# Patient Record
Sex: FEMALE | Race: WHITE | Employment: OTHER | URBAN - METROPOLITAN AREA
[De-identification: names, ages, dates, MRNs, and addresses within clinical notes are randomized per-mention and may not be internally consistent; named-entity substitution may affect disease eponyms.]

---

## 2017-09-26 ENCOUNTER — HOSPITAL ENCOUNTER (OUTPATIENT)
Dept: LAB | Age: 82
Discharge: HOME | End: 2017-09-26
Payer: MEDICARE

## 2017-09-26 ENCOUNTER — OFFICE VISIT (OUTPATIENT)
Dept: GYNECOLOGY | Age: 82
End: 2017-09-26

## 2017-09-26 VITALS
DIASTOLIC BLOOD PRESSURE: 58 MMHG | SYSTOLIC BLOOD PRESSURE: 129 MMHG | HEART RATE: 59 BPM | HEIGHT: 63 IN | BODY MASS INDEX: 27.46 KG/M2 | WEIGHT: 155 LBS

## 2017-09-26 DIAGNOSIS — L90.0 LICHEN SCLEROSUS ET ATROPHICUS: Primary | ICD-10-CM

## 2017-09-26 DIAGNOSIS — Z85.3 HISTORY OF BREAST CANCER: ICD-10-CM

## 2017-09-26 DIAGNOSIS — Z91.89 GYN EXAM FOR HIGH-RISK MEDICARE PATIENT: ICD-10-CM

## 2017-09-26 PROCEDURE — 88142 CYTOPATH C/V THIN LAYER: CPT | Performed by: OBSTETRICS & GYNECOLOGY

## 2017-09-26 RX ORDER — BRIMONIDINE TARTRATE, TIMOLOL MALEATE 2; 5 MG/ML; MG/ML
SOLUTION/ DROPS OPHTHALMIC
COMMUNITY
Start: 2014-05-12

## 2017-09-26 RX ORDER — OMEPRAZOLE 40 MG/1
CAPSULE, DELAYED RELEASE ORAL
Refills: 3 | COMMUNITY
Start: 2017-09-04

## 2017-09-26 NOTE — PATIENT INSTRUCTIONS
KnowFu Activation    Thank you for requesting access to KnowFu. Please follow the instructions below to securely access and download your online medical record. KnowFu allows you to send messages to your doctor, view your test results, renew your prescriptions, schedule appointments, and more. How Do I Sign Up? 1. In your internet browser, go to [email or web address]. 2. Click on the First Time User? Click Here link in the Sign In box. You will see the New Member Sign Up page. 3. Enter your KnowFu Access Code exactly as it appears below. You will not need to use this code after youve completed the sign-up process. If you do not sign up before the expiration date, you must request a new code. KnowFu Access Code: [email or web address]  Expires: 2017  9:33 AM (This is the date your KnowFu access code will )    4. Enter the last four digits of your Social Security Number (xxxx) and Date of Birth (mm/dd/yyyy) as indicated and click Submit. You will be taken to the next sign-up page. 5. Create a KnowFu ID. This will be your KnowFu login ID and cannot be changed, so think of one that is secure and easy to remember. 6. Create a KnowFu password. You can change your password at any time. 7. Enter your Password Reset Question and Answer. This can be used at a later time if you forget your password. 8. Enter your e-mail address. You will receive e-mail notification when new information is available in [address] 9. Click Sign Up. You can now view and download portions of your medical record. 10. Click the Download Summary menu link to download a portable copy of your medical information. Additional Information    If you have questions, please visit the Frequently Asked Questions section of the KnowFu website at [email or web address] Remember, KnowFu is NOT to be used for urgent needs. For medical emergencies, dial 911.

## 2017-09-26 NOTE — PROGRESS NOTES
One year check up, pt reports no abnormal spotting or bleeding, pt states she has no questions or concerns for today's visit, Patient states she is no longer taking the following medications: Aciphex, Timolol

## 2017-09-26 NOTE — PROGRESS NOTES
[address], [address], [address] [place]  [phone]  MD Mehnaz Ford MD    Patient ID:  Natalee Alaniz  [id number]  1934/83 y.o. Visit date: 9/26/2017    INTERVAL HISTORY: Natalee Alaniz is a  female with a history of vulvar lichen sclerosis. Pathology has shown   FINAL PATHOLOGIC DIAGNOSIS (2010)  1. Vulva, 3:00, biopsy:  Benign skin with hyperkeratosis and papillary dermal pigment deposition  See comment  2. Vulva, 9:61, biopsy:  Lichen sclerosis et atrophicus  See comment  3. Vulva, 9:00, biopsy:  Hyperkeratosis with increased junctional and papillary dermal pigmentation  See comment . Last cytology:  9/17/2016, 2/2014   Negative    Imaging history: mammogram current. Chemotherapy history: N/A    She is being seen today for routine followup at a twelve month interval. Symptomatic  use of Clobetasol. Active, no restrictions. Patient denies any abnormal bleeding or vaginal discharge. Weight stable.     OB/GYN ROS: Denies, dysuria, hematuria, vaginal discharge, abnormal vaginal bleeding, pelvic pain    Past Medical History:   Diagnosis Date    Aortic insufficiency     Arthritis     Cancer (HCC)     BREAST CA    Fibromyalgia     Gastro-oesophageal reflux disease with hiatal hernia     GERD (gastroesophageal reflux disease)     Glaucoma     High cholesterol     History of diagnostic mammography 3/21/16    Right breast, no evidence of malignancy    Hypertension     MGUS (monoclonal gammopathy of unknown significance)     Nausea & vomiting     Other ill-defined conditions     FIBROMYALGIA       Past Surgical History:   Procedure Laterality Date    CARDIAC SURG PROCEDURE UNLIST      aortic insuffientcy    HX CATARACT REMOVAL      LEFT EYE    HX CHOLECYSTECTOMY  1990    hernia repair  8/2013    HX GI  2011    COLONSCOPY/EGD    HX GI      hemrriodectomy 2014    HX HEENT      LASER LEFT EYE    HX HERNIA REPAIR      HX HYSTERECTOMY  1984    HX MASTECTOMY  1982    RIGHT MASTECTOMY/LYMPH NODES       Social History     Social History    Marital status:      Spouse name: N/A    Number of children: N/A    Years of education: N/A     Occupational History    Not on file. Social History Main Topics    Smoking status: Never Smoker    Smokeless tobacco: Never Used    Alcohol use No    Drug use: Yes     Special: Prescription    Sexual activity: Not on file     Other Topics Concern    Not on file     Social History Narrative       Family History   Problem Relation Age of Onset    Other Mother      CEREBRAL HEMORRHAGE    Other Father      AAA       Current Outpatient Prescriptions on File Prior to Visit   Medication Sig Dispense Refill    valsartan (DIOVAN) 80 mg tablet Take  by mouth daily.  Hydrochlorothiazide 12.5 mg tablet Take 12.5 mg by mouth daily.  metoprolol-XL (TOPROL XL) 25 mg XL tablet Take  by mouth nightly.  atorvastatin (LIPITOR) 10 mg tablet Take  by mouth nightly.  latanoprost (XALATAN) 0.005 % ophthalmic solution Administer 1 Drop to right eye nightly.  aspirin 81 mg tablet Take 162 mg by mouth nightly.  CALCIUM CITRATE (CITRACAL PO) Take 1 Tab by mouth daily.  LACTOBAC CMB #3/FOS/PANTETHINE (PROBIOTIC & ACIDOPHILUS PO) Take 1 Tab by mouth every morning.  cholecalciferol, vitamin d3, (VITAMIN D) 1,000 unit tablet Take 1,000 Units by mouth daily.  clobetasol (TEMOVATE) 0.05 % ointment APPLY TO AFFECTED AREA DAILY 30 g 6    rabeprazole (ACIPHEX) 20 mg tablet Take 20 mg by mouth every morning.  timolol (TIMOPTIC-XR) 0.5 % ophthalmic gel-forming Administer 1 Drop to right eye two (2) times a day. No current facility-administered medications on file prior to visit.         Allergies   Allergen Reactions    Clarithromycin Rash    Contrast Agent [Iodine] Rash    Penicillin G Rash       Review of Systems - History obtained from the patient  Hematological and Lymphatic ROS: negative for - bleeding problems, blood clots, swollen lymph nodes or weight loss  Respiratory ROS: no cough, shortness of breath, or wheezing  Cardiovascular ROS: no chest pain or dyspnea on exertion  Gastrointestinal ROS: no abdominal pain, change in bowel habits, or black or bloody stools  Genito-Urinary ROS: no dysuria, trouble voiding, or hematuria  Musculoskeletal ROS: negative for - joint pain or joint swelling  Dermatological ROS: negative for - rash or skin lesion changes    Negative     OBJECTIVE:  PHYSICAL EXAM  VITAL SIGNS: Vitals:    09/26/17 0936   BP: 129/58   Pulse: (!) 59   Weight: 155 lb (70.3 kg)   Height: 5' 3\" (1.6 m)   Body mass index is 27.46 kg/(m^2). GENERAL ARTIE: in no apparent distress, well developed    HEENT: within normal limits   RESPIRATORY: lungs clear to auscultation and percussion, no CVAT   CARDIOVASC: Regular rate and rhythm without [facility]   GASTROINT: soft, non-tender, without masses or organomegaly. No hernia low midline/upper abd scar. MUSCULOSKEL: no joint tenderness, deformity or swelling. EXTREMITIES: extremities normal, atraumatic, no edema   PELVIC: External genitalia: normal general appearance Vaginal: atrophic mucosa  No active inflamation/erythema  BUS negative  Vagina: Atrophic, no gross lesion. No suspicious induration or nodularity. Narrowed introitus  Cytology taken  Bimanual: No suspicious masses, induration or nodularity. PSW clear   RECTAL: rectal exam not indicated   LEESA SURVEY: Cervical, supraclavicular, and ingunal nodes normal. No inguinal hernia   NEURO: Grossly normal     DATE REVIEW as available:      IMPRESSION AND PLAN:    Karen Renee has a working diagnosis of L. Sclerosis and ongoing vulvar lesions. Continue clobetasol as needed. Hx Breast cancer, ER negative per report: mammogram due, followed in [place], she is taking care of this.     Cytology taken without friability    Return twelve months or PRN.       Vee Tyler MD  9/26/2017/10:13 AM

## 2017-10-04 NOTE — PROGRESS NOTES
Patient: Catie Cleveland  SSN: [id number]  : 1934    Date:    10/4/2017    Ms. Cristina Plasencia's cytology/Pap smear has been interpreted as within normal limts. I would ask that subsequent Pap smears be performed at the interval discussed at the last office visit.     If there are any questions please do not hesitate to contact our offices ([phone])    Daniel Dominguez MD

## 2018-10-02 ENCOUNTER — OFFICE VISIT (OUTPATIENT)
Dept: GYNECOLOGY | Age: 83
End: 2018-10-02

## 2018-10-02 VITALS
DIASTOLIC BLOOD PRESSURE: 52 MMHG | BODY MASS INDEX: 27.78 KG/M2 | SYSTOLIC BLOOD PRESSURE: 126 MMHG | HEART RATE: 56 BPM | HEIGHT: 63 IN | WEIGHT: 156.8 LBS

## 2018-10-02 DIAGNOSIS — N90.4 LICHEN SCLEROSUS OF FEMALE GENITALIA: Primary | ICD-10-CM

## 2018-10-02 RX ORDER — IRBESARTAN 150 MG/1
TABLET ORAL
COMMUNITY
Start: 2018-09-18

## 2018-10-02 NOTE — PROGRESS NOTES
[facility], [address] 
[phone] MD Poli Miller MD 
 
Patient ID: Santiago Schilder [id number] 
1934/84 y.o. Visit date: 10/2/2018 INTERVAL HISTORY: Santiago Schilder is a  female with a history of vulvar lichen sclerosis. Pathology from 2010 as per below. She is being seen today for routine followup at a twelve month interval. Denies vulvar itching/irritation or any new bumps/lumps. Denies vaginal bleeding/discharge. Denies abdominal/pelvic pain, change in appetite or bowel movements, hematuria, hematochezia, or urinary symptoms. She does report urinary incontinence, but this has been a problem historically and is stable. She uses clobetasol about 2-3 times per month on her vulvar. PATHOLOGY REVIEW: 
FINAL PATHOLOGIC DIAGNOSIS (2010) 1. Vulva, 3:00, biopsy: 
Benign skin with hyperkeratosis and papillary dermal pigment deposition See comment 2. Vulva, 9:78, biopsy: 
Lichen sclerosis et atrophicus See comment 3. Vulva, 9:00, biopsy: Hyperkeratosis with increased junctional and papillary dermal pigmentation See commen OB/GYN ROS: Denies, dysuria, hematuria, vaginal discharge, abnormal vaginal bleeding, pelvic pain Past Medical History:  
Diagnosis Date  Aortic insufficiency  Arthritis  Cancer ([facility] [address] BREAST CA  
 Fibromyalgia  Gastro-oesophageal reflux disease with hiatal hernia  GERD (gastroesophageal reflux disease)  Glaucoma  High cholesterol  History of diagnostic mammography 3/21/16 Right breast, no evidence of malignancy  Hypertension  MGUS (monoclonal gammopathy of unknown significance)  Nausea & vomiting  Other ill-defined conditions(799.89) FIBROMYALGIA Past Surgical History:  
Procedure Laterality Date  CARDIAC SURG PROCEDURE UNLIST    
 aortic insuffientcy  HX CATARACT REMOVAL    
 LEFT EYE  
  HX CHOLECYSTECTOMY  1990  
 hernia repair  8/2013  HX GI  2011 COLONSCOPY/EGD  HX GI    
 hemrriodectomy 2014  HX HEENT    
 LASER LEFT EYE [address] [facility] RIGHT MASTECTOMY/LYMPH NODES Social History Social History  Marital status:  Spouse name: N/A  
 Number of children: N/A  
 Years of education: N/A Occupational History  Not on file. Social History Main Topics  Smoking status: Never Smoker  Smokeless tobacco: Never Used  Alcohol use No  
 Drug use: Yes Special: Prescription  Sexual activity: Not on file Other Topics Concern  Not on file Social History Narrative Family History Problem Relation Age of Onset [facility] Other Mother CEREBRAL HEMORRHAGE  Other Father AAA Current Outpatient Prescriptions on File Prior to Visit Medication Sig Dispense Refill  omeprazole (PRILOSEC) 40 mg capsule TAKE 1 CAPSULE BY MOUTH 30-60 MINUTES BEFORE BREAKFAST  3  
 brimonidine-timolol (COMBIGAN) 0.2-0.5 % drop ophthalmic solution  VIT A/VIT C/VIT E/ZINC/COPPER (PRESERVISION AREDS PO) Take  by mouth.  Hydrochlorothiazide 12.5 mg tablet Take 12.5 mg by mouth daily.  metoprolol-XL (TOPROL XL) 25 mg XL tablet Take  by mouth nightly.  timolol (TIMOPTIC-XR) 0.5 % ophthalmic gel-forming Administer 1 Drop to right eye two (2) times a day.  latanoprost (XALATAN) 0.005 % ophthalmic solution Administer 1 Drop to right eye nightly.  aspirin 81 mg tablet Take 162 mg by mouth nightly.  CALCIUM CITRATE (CITRACAL PO) Take 1 Tab by mouth daily.  LACTOBAC CMB #3/FOS/PANTETHINE (PROBIOTIC & ACIDOPHILUS PO) Take 1 Tab by mouth every morning.  cholecalciferol, vitamin d3, (VITAMIN D) 1,000 unit tablet Take 1,000 Units by mouth daily.     
 clobetasol (TEMOVATE) 0.05 % ointment APPLY TO AFFECTED AREA DAILY 30 g 6  
  valsartan (DIOVAN) 80 mg tablet Take  by mouth daily.  rabeprazole (ACIPHEX) 20 mg tablet Take 20 mg by mouth every morning.  atorvastatin (LIPITOR) 10 mg tablet Take  by mouth nightly. No current facility-administered medications on file prior to visit. Allergies Allergen Reactions  Clarithromycin Rash  Contrast Agent [Iodine] Rash  Penicillin G Rash Review of Systems - History obtained from the patient Hematological and Lymphatic ROS: negative for - bleeding problems, blood clots, swollen lymph nodes or weight loss Respiratory ROS: no cough, shortness of breath, or wheezing Cardiovascular ROS: no chest pain or dyspnea on exertion Gastrointestinal ROS: no abdominal pain, change in bowel habits, or black or bloody stools Genito-Urinary ROS: no dysuria, trouble voiding, or hematuria Musculoskeletal ROS: negative for - joint pain or joint swelling Dermatological ROS: negative for - rash or skin lesion changes Negative OBJECTIVE: 
 
Visit Vitals  /52 (BP 1 Location: Left arm, BP Patient Position: Sitting)  Pulse (!) 56  
 Ht 5' 3\" (1.6 m)  Wt 156 lb 12.8 oz (71.1 kg)  BMI 27.78 kg/m2 Physical Exam: 
General: Alert and oriented. No acute distress. Well-nourished HEENT: No thyroid enlargment. Neck supple without restrictions. Sclera normal. Normal occular motion. Moist mucous membranes. Lymphatics: No evidence of cervical, or subclavicular adenopathy. Respiratory: clear to auscultation and percussion to the bases. No CVAT. Cardiovascular: regular rate and rhythm. No murmurs, rubs, or gallops. Gastrointestinal: soft, non-tender, non-distended, no masses or organomegaly. Well-healed incision. Musculoskeletal: normal gait. No joint tenderness, deformity or swelling. No muscular tenderness. Extremities: extremities normal, atraumatic, no cyanosis or edema. Pelvic: exam chaperoned by nurse.  External genitalia appear normal with some atrophy. No lesions noted. On speculum exam, the vagina is atrophic. The uterus and cervix are surgically absent. On bimanual exam, there is no masses or nodularity. Rectal exam deferred. Neuro: Grossly intact. Normal gait and movement. No acute deficit Skin: No evidence of rashes or skin changes. IMPRESSION AND PLAN: 
 
Eliana Benedict is a 80 y.o. female with Lichen Sclerosis Reviewed patient's course to date. She uses clobetasol sparingly, approximately 2-3 times per month. Reassured patient that today's exam is normal without evidence of vulvar lesions. Plan to continue clobetasol as needed. In regard to her vaginal cytology, she has had a hysterectomy and has never had an abnormal pap smear. Based on ASCCP guidelines, no further pap smears are indicated. Patient also has a history of breast cancer, ER negative. She is followed locally for this and receives regular imaging. RTC in 12 months for continued surveillance. Reviewed precautionary symptoms to return to clinic sooner. All questions and concerns were addressed with the patient and she is comfortable with the plan.  
 
 
Seymour Jimenez MD 
10/2/2018/10:13 AM

## 2018-10-02 NOTE — PROGRESS NOTES
One year check up, pt states she has no questions or concerns for today's visit, Patient states she is no longer taking the following medications:  Lipitor, aciphex, diovan 1. Have you been to the ER, urgent care clinic since your last visit? Hospitalized since your last visit?  no 
 
2. Have you seen or consulted any other health care providers outside of the [address] [place] [address] since your last visit? Include any pap smears or colon screening.    no

## 2019-11-19 ENCOUNTER — OFFICE VISIT (OUTPATIENT)
Dept: GYNECOLOGY | Age: 84
End: 2019-11-19

## 2019-11-19 VITALS
BODY MASS INDEX: 25.41 KG/M2 | SYSTOLIC BLOOD PRESSURE: 170 MMHG | HEIGHT: 63 IN | WEIGHT: 143.4 LBS | HEART RATE: 62 BPM | DIASTOLIC BLOOD PRESSURE: 69 MMHG

## 2019-11-19 DIAGNOSIS — N90.4 LICHEN SCLEROSUS OF FEMALE GENITALIA: Primary | ICD-10-CM

## 2019-11-19 RX ORDER — CLOBETASOL PROPIONATE 0.5 MG/G
OINTMENT TOPICAL
Qty: 30 G | Refills: 1 | Status: SHIPPED | OUTPATIENT
Start: 2019-11-19

## 2019-11-19 RX ORDER — ADHESIVE BANDAGE
30 BANDAGE TOPICAL DAILY PRN
COMMUNITY

## 2019-11-19 NOTE — PROGRESS NOTES
[address] [place] [address] [address], [address]  [phone]  MD Jorge Pierce MD    Patient ID:  Tino Son  [id number]  1934/85 y.o. Visit date: 11/19/2019    INTERVAL HISTORY: Tino Son is a 80 y.o.  female with a history of vulvar lichen sclerosis. Pathology from 2010 as per below. She is being seen today for routine followup at a twelve month interval. Denies vulvar itching/irritation or any new bumps/lumps. Denies vaginal bleeding/discharge. Denies abdominal/pelvic pain, change in appetite or bowel movements, hematuria, hematochezia, or urinary symptoms. She does report urinary incontinence, but this has been a problem historically and is stable. She uses clobetasol about 2-3 times every 2-3 months. In the last year she has had a vertebral fracture and treatment for glaucoma. PATHOLOGY REVIEW:  FINAL PATHOLOGIC DIAGNOSIS (2010)  1. Vulva, 3:00, biopsy:  Benign skin with hyperkeratosis and papillary dermal pigment deposition  See comment  2. Vulva, 0:90, biopsy:  Lichen sclerosis et atrophicus  See comment  3.  Vulva, 9:00, biopsy:  Hyperkeratosis with increased junctional and papillary dermal pigmentation  See commen    OB/GYN ROS: Denies, dysuria, hematuria, vaginal discharge, abnormal vaginal bleeding, pelvic pain    Past Medical History:   Diagnosis Date    Aortic insufficiency     Arthritis     Cancer (HCC)     BREAST CA    Fibromyalgia     Gastro-oesophageal reflux disease with hiatal hernia     GERD (gastroesophageal reflux disease)     Glaucoma     High cholesterol     History of diagnostic mammography 3/21/16    Right breast, no evidence of malignancy    Hypertension     MGUS (monoclonal gammopathy of unknown significance)     Nausea & vomiting     Other ill-defined conditions(349.89)     FIBROMYALGIA       Past Surgical History:   Procedure Laterality Date    CARDIAC SURG PROCEDURE UNLIST      aortic insuffientcy    HX CATARACT REMOVAL      LEFT EYE    HX CHOLECYSTECTOMY  1990    hernia repair  8/2013    HX GI  2011    COLONSCOPY/EGD    HX GI      hemrriodectomy 2014    HX HEENT      LASER LEFT EYE    HX HERNIA REPAIR      HX HYSTERECTOMY  1984    HX MASTECTOMY  1982    RIGHT MASTECTOMY/LYMPH NODES       Social History     Socioeconomic History    Marital status:      Spouse name: Not on file    Number of children: Not on file    Years of education: Not on file    Highest education level: Not on file   Occupational History    Not on file   Social Needs    Financial resource strain: Not on file    Food insecurity:     Worry: Not on file     Inability: Not on file    Transportation needs:     Medical: Not on file     Non-medical: Not on file   Tobacco Use    Smoking status: Never Smoker    Smokeless tobacco: Never Used   Substance and Sexual Activity    Alcohol use: No    Drug use: Yes     Types: Prescription    Sexual activity: Not on file   Lifestyle    Physical activity:     Days per week: Not on file     Minutes per session: Not on file    Stress: Not on file   Relationships    Social connections:     Talks on phone: Not on file     Gets together: Not on file     Attends Gnosticism service: Not on file     Active member of club or organization: Not on file     Attends meetings of clubs or organizations: Not on file     Relationship status: Not on file    Intimate partner violence:     Fear of current or ex partner: Not on file     Emotionally abused: Not on file     Physically abused: Not on file     Forced sexual activity: Not on file   Other Topics Concern    Not on file   Social History Narrative    Not on file       Family History   Problem Relation Age of Onset    Other Mother         CEREBRAL HEMORRHAGE    Other Father         AAA       Current Outpatient Medications on File Prior to Visit   Medication Sig Dispense Refill    magnesium hydroxide (GALAN MILK OF MAGNESIA) 400 mg/5 mL suspension Take 30 mL by mouth daily as needed for Constipation.  COQ10, LIPOSOMAL UBIQUINOL, PO Take  by mouth.  brimonidine-timolol (COMBIGAN) 0.2-0.5 % drop ophthalmic solution       VIT A/VIT C/VIT E/ZINC/COPPER (PRESERVISION AREDS PO) Take  by mouth.  Hydrochlorothiazide 12.5 mg tablet Take 12.5 mg by mouth daily.  metoprolol-XL (TOPROL XL) 25 mg XL tablet Take  by mouth nightly.  latanoprost (XALATAN) 0.005 % ophthalmic solution Administer 1 Drop to right eye nightly.  CALCIUM CITRATE (CITRACAL PO) Take 1 Tab by mouth daily.  LACTOBAC CMB #3/FOS/PANTETHINE (PROBIOTIC & ACIDOPHILUS PO) Take 1 Tab by mouth every morning.  cholecalciferol, vitamin d3, (VITAMIN D) 1,000 unit tablet Take 1,000 Units by mouth daily.  irbesartan (AVAPRO) 150 mg tablet       docosahexanoic acid/epa (FISH OIL PO) Take  by mouth.  omeprazole (PRILOSEC) 40 mg capsule TAKE 1 CAPSULE BY MOUTH 30-60 MINUTES BEFORE BREAKFAST  3    valsartan (DIOVAN) 80 mg tablet Take  by mouth daily.  RABEprazole (ACIPHEX) 20 mg tablet Take 20 mg by mouth every morning.  atorvastatin (LIPITOR) 10 mg tablet Take  by mouth nightly.  timolol (TIMOPTIC-XR) 0.5 % ophthalmic gel-forming Administer 1 Drop to right eye two (2) times a day.  aspirin 81 mg tablet Take 162 mg by mouth nightly. No current facility-administered medications on file prior to visit.         Allergies   Allergen Reactions    Clarithromycin Rash    Contrast Agent [Iodine] Rash    Lipitor [Atorvastatin] Other (comments)     Per patient, bone pain    Penicillin G Rash    Sulfamethoxazole-Trimethoprim Rash       Review of Systems - History obtained from the patient  Hematological and Lymphatic ROS: negative for - bleeding problems, blood clots, swollen lymph nodes or weight loss  Respiratory ROS: no cough, shortness of breath, or wheezing  Cardiovascular ROS: no chest pain or dyspnea on exertion  Gastrointestinal ROS: no abdominal pain, change in bowel habits, or black or bloody stools  Genito-Urinary ROS: no dysuria, trouble voiding, or hematuria  Musculoskeletal ROS: negative for - joint pain or joint swelling  Dermatological ROS: negative for - rash or skin lesion changes    Negative     OBJECTIVE:    Visit Vitals  /69 (BP 1 Location: Left arm, BP Patient Position: Sitting)   Pulse 62   Ht 5' 3\" (1.6 m)   Wt 143 lb 6.4 oz (65 kg)   BMI 25.40 kg/m²      Physical Exam:  General: Alert and oriented. No acute distress. Well-nourished  HEENT: No thyroid enlargment. Neck supple without restrictions. Sclera normal. Normal occular motion. Moist mucous membranes. Lymphatics: No evidence of cervical, or subclavicular adenopathy. Respiratory: clear to auscultation and percussion to the bases. No CVAT. Cardiovascular: regular rate and rhythm. No murmurs, rubs, or gallops. Gastrointestinal: soft, non-tender, non-distended, no masses or organomegaly. Well-healed incision. Musculoskeletal: normal gait. No joint tenderness, deformity or swelling. No muscular tenderness. Extremities: extremities normal, atraumatic, no cyanosis or edema. Pelvic: exam chaperoned by nurse. External genitalia appear normal with some atrophy. No lesions noted. On speculum exam, the vagina is atrophic. The uterus and cervix are surgically absent. On bimanual exam, there is no masses or nodularity. Rectal exam deferred. Neuro: Grossly intact. Normal gait and movement. No acute deficit  Skin: No evidence of rashes or skin changes. IMPRESSION AND PLAN:    Mecca Tovar is a 80 y.o. female with Lichen Sclerosis     Problem List Items Addressed This Visit        Integumentary    Lichen sclerosus of female genitalia - Primary    Relevant Medications    clobetasol (TEMOVATE) 0.05 % ointment          Reviewed patient's course to date.  She uses clobetasol sparingly, approximately 2-3 times every 2-3 month. Refill given today. Reassured patient that today's exam is normal without evidence of vulvar lesions. Plan to continue clobetasol as needed. In regard to her vaginal cytology, she has had a hysterectomy and has never had an abnormal pap smear. Based on ASCCP guidelines, no further pap smears are indicated. Patient also has a history of breast cancer, ER negative. She is followed locally for this and receives regular imaging. RTC in 12 months for continued surveillance. Reviewed precautionary symptoms to return to clinic sooner. All questions and concerns were addressed with the patient and she is comfortable with the plan.     Sherry Barnes MD

## 2019-11-19 NOTE — PROGRESS NOTES
One year check up, pt reports no abnormal spotting or bleeding, pt states she has been having shoulder and arm pain, she has seen neurologist and orthopedic md, pt states she needs a refill on the clobetasol    Initial blood pressure reading 168/69, repeat blood pressure reading 170/69      1. Have you been to the ER, urgent care clinic since your last visit? Hospitalized since your last visit?  no    2. Have you seen or consulted any other health care providers outside of the [address] [place] [address] since your last visit? Include any pap smears or colon screening.    no

## 2020-02-10 PROBLEM — H34.8310 BRANCH RETINAL VEIN OCCLUSION WITH MACULAR EDEMA: Noted: 2021-12-01

## 2020-02-10 PROBLEM — H25.11 NS CATARACT: Noted: 2019-12-20

## 2020-02-10 PROBLEM — Z98.41 CATARACT EXTRACTION STATUS: Noted: 2020-02-10

## 2020-02-10 PROBLEM — H40.1134 POAG, INDETERMINATE: Noted: 2021-12-01

## 2020-02-10 PROBLEM — Z98.42 CATARACT EXTRACTION STATUS: Noted: 2020-02-10

## 2020-02-10 PROBLEM — H35.372 EPIRETINAL MEMBRANE: Noted: 2021-12-01

## 2020-12-07 NOTE — PROGRESS NOTES
One year follow up for lichen sclerosus, pt reports no abnormal spotting or bleeding, pt states she has no questions or concerns for today's visit    1. Have you been to the ER, urgent care clinic since your last visit? Hospitalized since your last visit?  no    2. Have you seen or consulted any other health care providers outside of the [address] [place] [address] since your last visit? Include any pap smears or colon screening.    No    Vitals:    12/08/20 0928 12/08/20 0937   BP: (!) 153/60 (!) 148/64   BP 1 Location: Left arm Left arm   BP Patient Position: Sitting Sitting   Pulse: 69 62   Weight: 158 lb 3.2 oz (71.8 kg)    Height: 5' 3\" (1.6 m)

## 2020-12-07 NOTE — PROGRESS NOTES
[address] [place] [address] [address], [address] [facility]  [phone]  MD Davey Lizama MD    Patient ID:  Cara Rubinstein  [id number]  1934/86 y.o. Visit date: 12/8/2020    INTERVAL HISTORY: Cara Rubinstein is a 80 y.o.  female with a history of vulvar lichen sclerosis. Pathology from 2010 as per below. She is being seen today for routine followup at a twelve month interval. Denies vulvar itching/irritation or any new bumps/lumps. Denies vaginal bleeding/discharge. Denies abdominal/pelvic pain, change in appetite or bowel movements, hematuria, hematochezia, or urinary symptoms. She does report urinary incontinence, but this has been a problem historically and is stable. She uses clobetasol about 2-3 times every 2-3 months. In the last year she has had a vertebral fracture and treatment for glaucoma. PATHOLOGY REVIEW:  FINAL PATHOLOGIC DIAGNOSIS (2010)  1. Vulva, 3:00, biopsy:  Benign skin with hyperkeratosis and papillary dermal pigment deposition  See comment  2. Vulva, 4:82, biopsy:  Lichen sclerosis et atrophicus  See comment  3.  Vulva, 9:00, biopsy:  Hyperkeratosis with increased junctional and papillary dermal pigmentation  See commen    OB/GYN ROS: Denies, dysuria, hematuria, vaginal discharge, abnormal vaginal bleeding, pelvic pain    Past Medical History:   Diagnosis Date    Aortic insufficiency     Arthritis     Cancer (HCC)     BREAST CA    Fibromyalgia     Gastro-oesophageal reflux disease with hiatal hernia     GERD (gastroesophageal reflux disease)     Glaucoma     High cholesterol     History of diagnostic mammography 3/21/16    Right breast, no evidence of malignancy    Hypertension     MGUS (monoclonal gammopathy of unknown significance)     Nausea & vomiting     Other ill-defined conditions(799.89)     FIBROMYALGIA       Past Surgical History:   Procedure Laterality Date    CARDIAC SURG PROCEDURE UNLIST      aortic insuffientcy    HX CATARACT REMOVAL      LEFT EYE    HX CHOLECYSTECTOMY  1990    hernia repair  8/2013    HX GI  2011    COLONSCOPY/EGD    HX GI      hemrriodectomy 2014    HX HEENT      LASER LEFT EYE    HX HERNIA REPAIR      HX HYSTERECTOMY  1984    HX MASTECTOMY  1982    RIGHT MASTECTOMY/LYMPH NODES       Social History     Socioeconomic History    Marital status:      Spouse name: Not on file    Number of children: Not on file    Years of education: Not on file    Highest education level: Not on file   Occupational History    Not on file   Social Needs    Financial resource strain: Not on file    Food insecurity     Worry: Not on file     Inability: Not on file    Transportation needs     Medical: Not on file     Non-medical: Not on file   Tobacco Use    Smoking status: Never Smoker    Smokeless tobacco: Never Used   Substance and Sexual Activity    Alcohol use: No    Drug use: Yes     Types: Prescription    Sexual activity: Not on file   Lifestyle    Physical activity     Days per week: Not on file     Minutes per session: Not on file    Stress: Not on file   Relationships    Social connections     Talks on phone: Not on file     Gets together: Not on file     Attends Rastafarian service: Not on file     Active member of club or organization: Not on file     Attends meetings of clubs or organizations: Not on file     Relationship status: Not on file    Intimate partner violence     Fear of current or ex partner: Not on file     Emotionally abused: Not on file     Physically abused: Not on file     Forced sexual activity: Not on file   Other Topics Concern    Not on file   Social History Narrative    Not on file       Family History   Problem Relation Age of Onset    Other Mother         CEREBRAL HEMORRHAGE    Other Father         AAA       Current Outpatient Medications on File Prior to Visit   Medication Sig Dispense Refill    clobetasol (TEMOVATE) 0.05 % ointment APPLY TO AFFECTED AREA DAILY 30 g 1    irbesartan (AVAPRO) 150 mg tablet       COQ10, LIPOSOMAL UBIQUINOL, PO Take  by mouth.  brimonidine-timolol (COMBIGAN) 0.2-0.5 % drop ophthalmic solution       VIT A/VIT C/VIT E/ZINC/COPPER (PRESERVISION AREDS PO) Take  by mouth.  Hydrochlorothiazide 12.5 mg tablet Take 12.5 mg by mouth daily.  metoprolol-XL (TOPROL XL) 25 mg XL tablet Take  by mouth nightly.  atorvastatin (LIPITOR) 10 mg tablet Take  by mouth nightly.  timolol (TIMOPTIC-XR) 0.5 % ophthalmic gel-forming Administer 1 Drop to right eye two (2) times a day.  latanoprost (XALATAN) 0.005 % ophthalmic solution Administer 1 Drop to right eye nightly.  CALCIUM CITRATE (CITRACAL PO) Take 1 Tab by mouth daily.  LACTOBAC CMB #3/FOS/PANTETHINE (PROBIOTIC & ACIDOPHILUS PO) Take 1 Tab by mouth every morning.  cholecalciferol, vitamin d3, (VITAMIN D) 1,000 unit tablet Take 1,000 Units by mouth daily.  magnesium hydroxide ([employer] MILK OF MAGNESIA) 400 mg/5 mL suspension Take 30 mL by mouth daily as needed for Constipation.  docosahexanoic acid/epa (FISH OIL PO) Take  by mouth.  omeprazole (PRILOSEC) 40 mg capsule TAKE 1 CAPSULE BY MOUTH 30-60 MINUTES BEFORE BREAKFAST  3    valsartan (DIOVAN) 80 mg tablet Take  by mouth daily.  RABEprazole (ACIPHEX) 20 mg tablet Take 20 mg by mouth every morning.  aspirin 81 mg tablet Take 162 mg by mouth nightly. No current facility-administered medications on file prior to visit.         Allergies   Allergen Reactions    Clarithromycin Rash    Contrast Agent [Iodine] Rash    Lipitor [Atorvastatin] Other (comments)     Per patient, bone pain    Penicillin G Rash    Sulfamethoxazole-Trimethoprim Rash       Review of Systems - History obtained from the patient  Hematological and Lymphatic ROS: negative for - bleeding problems, blood clots, swollen lymph nodes or weight loss  Respiratory ROS: no cough, shortness of breath, or wheezing  Cardiovascular ROS: no chest pain or dyspnea on exertion  Gastrointestinal ROS: no abdominal pain, change in bowel habits, or black or bloody stools  Genito-Urinary ROS: no dysuria, trouble voiding, or hematuria  Musculoskeletal ROS: negative for - joint pain or joint swelling  Dermatological ROS: negative for - rash or skin lesion changes    Negative     OBJECTIVE:    Visit Vitals  BP (!) 148/64 (BP 1 Location: Left arm, BP Patient Position: Sitting)   Pulse 62   Ht 5' 3\" (1.6 m)   Wt 158 lb 3.2 oz (71.8 kg)   BMI 28.02 kg/m²      Physical Exam:  General: Alert and oriented. No acute distress. Well-nourished  HEENT: No thyroid enlargment. Neck supple without restrictions. Sclera normal. Normal occular motion. Moist mucous membranes. Lymphatics: No evidence of cervical, or subclavicular adenopathy. Respiratory: clear to auscultation and percussion to the bases. No CVAT. Cardiovascular: regular rate and rhythm. No murmurs, rubs, or gallops. Gastrointestinal: soft, non-tender, non-distended, no masses or organomegaly. Well-healed incision. Musculoskeletal: normal gait. No joint tenderness, deformity or swelling. No muscular tenderness. Extremities: extremities normal, atraumatic, no cyanosis or edema. Pelvic: exam chaperoned by nurse. External genitalia appear normal with some atrophy. No lesions noted. On speculum exam, the vagina is atrophic. The uterus and cervix are surgically absent. On bimanual exam, there is no masses or nodularity. Rectal exam deferred. Neuro: Grossly intact. Normal gait and movement. No acute deficit  Skin: No evidence of rashes or skin changes. IMPRESSION AND PLAN:    Natividad Escamilla is a 80 y.o. female with Lichen Sclerosis     Problem List Items Addressed This Visit        Integumentary    Lichen sclerosus of female genitalia - Primary          Reviewed patient's course to date.  She uses clobetasol sparingly, approximately 2-3 times every 2-3 month. Reassured patient that today's exam is normal without evidence of vulvar lesions. Plan to continue clobetasol as needed. In regard to her vaginal cytology, she has had a hysterectomy and has never had an abnormal pap smear. Based on ASCCP guidelines, no further pap smears are indicated. Patient also has a history of breast cancer, ER negative. She is followed locally for this and receives regular imaging. RTC in 12 months for continued surveillance. Reviewed precautionary symptoms to return to clinic sooner. All questions and concerns were addressed with the patient and she is comfortable with the plan.     Carroll Beckman MD

## 2020-12-08 ENCOUNTER — OFFICE VISIT (OUTPATIENT)
Dept: GYNECOLOGY | Age: 85
End: 2020-12-08
Payer: MEDICARE

## 2020-12-08 VITALS
WEIGHT: 158.2 LBS | DIASTOLIC BLOOD PRESSURE: 64 MMHG | HEIGHT: 63 IN | BODY MASS INDEX: 28.03 KG/M2 | HEART RATE: 62 BPM | SYSTOLIC BLOOD PRESSURE: 148 MMHG

## 2020-12-08 DIAGNOSIS — N90.4 LICHEN SCLEROSUS OF FEMALE GENITALIA: Primary | ICD-10-CM

## 2020-12-08 PROCEDURE — G0463 HOSPITAL OUTPT CLINIC VISIT: HCPCS | Performed by: OBSTETRICS & GYNECOLOGY

## 2020-12-08 PROCEDURE — 99213 OFFICE O/P EST LOW 20 MIN: CPT | Performed by: OBSTETRICS & GYNECOLOGY

## 2021-12-01 ENCOUNTER — PREPPED CHART (OUTPATIENT)
Dept: URBAN - METROPOLITAN AREA CLINIC 98 | Facility: CLINIC | Age: 86
End: 2021-12-01

## 2021-12-01 PROBLEM — H40.1134 POAG, INDETERMINATE: Noted: 2021-12-01

## 2021-12-01 PROBLEM — H35.372 EPIRETINAL MEMBRANE: Status: STABILIZING | Noted: 2021-12-01

## 2021-12-01 PROBLEM — H34.8310 BRANCH RETINAL VEIN OCCLUSION WITH MACULAR EDEMA: Status: STABILIZING | Noted: 2021-12-01

## 2021-12-01 PROBLEM — H35.372 EPIRETINAL MEMBRANE: Noted: 2021-12-01

## 2021-12-01 PROBLEM — H34.8310 BRANCH RETINAL VEIN OCCLUSION WITH MACULAR EDEMA: Noted: 2021-12-01

## 2022-02-18 ASSESSMENT — TONOMETRY
OD_IOP_MMHG: 9
OS_IOP_MMHG: 4

## 2022-02-18 ASSESSMENT — VISUAL ACUITY
OS_SC: 20/70-3
OD_SC: 20/25

## 2022-03-16 ENCOUNTER — FOLLOW UP (OUTPATIENT)
Dept: URBAN - METROPOLITAN AREA CLINIC 98 | Facility: CLINIC | Age: 87
End: 2022-03-16

## 2022-03-16 DIAGNOSIS — H35.372: ICD-10-CM

## 2022-03-16 DIAGNOSIS — H34.8310: ICD-10-CM

## 2022-03-16 DIAGNOSIS — H40.1134: ICD-10-CM

## 2022-03-16 PROCEDURE — PFS EYLEA PFS

## 2022-03-16 PROCEDURE — 67028 INJECTION EYE DRUG: CPT

## 2022-03-16 PROCEDURE — 99214 OFFICE O/P EST MOD 30 MIN: CPT | Mod: 25

## 2022-03-16 PROCEDURE — 92202 OPSCPY EXTND ON/MAC DRAW: CPT | Mod: 59

## 2022-03-16 PROCEDURE — 92134 CPTRZ OPH DX IMG PST SGM RTA: CPT

## 2022-03-16 ASSESSMENT — TONOMETRY
OD_IOP_MMHG: 10
OS_IOP_MMHG: 8

## 2022-03-16 ASSESSMENT — VISUAL ACUITY
OS_SC: 20/80-2
OD_SC: 20/25

## 2022-03-18 PROBLEM — N90.4 LICHEN SCLEROSUS OF FEMALE GENITALIA: Status: ACTIVE | Noted: 2018-10-02

## 2022-06-08 ENCOUNTER — ESTABLISHED COMPREHENSIVE EXAM (OUTPATIENT)
Dept: URBAN - METROPOLITAN AREA CLINIC 98 | Facility: CLINIC | Age: 87
End: 2022-06-08

## 2022-06-08 DIAGNOSIS — H35.372: ICD-10-CM

## 2022-06-08 DIAGNOSIS — H34.8310: ICD-10-CM

## 2022-06-08 DIAGNOSIS — H40.1134: ICD-10-CM

## 2022-06-08 PROCEDURE — 92134 CPTRZ OPH DX IMG PST SGM RTA: CPT

## 2022-06-08 PROCEDURE — 92202 OPSCPY EXTND ON/MAC DRAW: CPT | Mod: 59

## 2022-06-08 PROCEDURE — 67028 INJECTION EYE DRUG: CPT

## 2022-06-08 PROCEDURE — PFS EYLEA PFS

## 2022-06-08 PROCEDURE — 92014 COMPRE OPH EXAM EST PT 1/>: CPT | Mod: 25

## 2022-06-08 ASSESSMENT — VISUAL ACUITY
OS_SC: 20/80-2
OD_SC: 20/20

## 2022-06-08 ASSESSMENT — TONOMETRY: OD_IOP_MMHG: 9

## 2022-07-18 NOTE — PROGRESS NOTES
One year follow up for lichen sclerosus, pt last seen on 12/8/2020, pt reports no abnormal spotting or bleeding, pt states she had nausea and diarrhea for approx. 3 months at the beginning of year, cause of weight loss    1. Have you been to the ER, urgent care clinic since your last visit? Hospitalized since your last visit?  no    2. Have you seen or consulted any other health care providers outside of the [address] [place] [address] since your last visit? Include any pap smears or colon screening.    no

## 2022-07-18 NOTE — PROGRESS NOTES
[address] [place] [address] [address], [address] [facility]  [phone]  MD Kailash Lopez MD    Patient ID:  Ashanti Velazco  [id number]  1934/87 y.o. Visit date: 7/19/2022    INTERVAL HISTORY: Ashanti Velazco is a 80 y.o.  female with a history of vulvar lichen sclerosis. Pathology from 2010 as per below. She is being seen today for routine followup at a twelve month interval. Denies vulvar itching/irritation or any new bumps/lumps. Denies vaginal bleeding/discharge. Denies abdominal/pelvic pain, change in appetite or bowel movements, hematuria, hematochezia, or urinary symptoms. She does report urinary incontinence, but this has been a problem historically and is stable. She uses clobetasol about 2-3 times every 2-3 months. In the last year she has had a vertebral fracture and treatment for glaucoma. PATHOLOGY REVIEW:  FINAL PATHOLOGIC DIAGNOSIS (2010)  1. Vulva, 3:00, biopsy:  Benign skin with hyperkeratosis and papillary dermal pigment deposition  See comment  2. Vulva, 7:22, biopsy:  Lichen sclerosis et atrophicus  See comment  3.  Vulva, 9:00, biopsy:  Hyperkeratosis with increased junctional and papillary dermal pigmentation  See commen    OB/GYN ROS: Denies, dysuria, hematuria, vaginal discharge, abnormal vaginal bleeding, pelvic pain    Past Medical History:   Diagnosis Date    Aortic insufficiency     Arthritis     Cancer (HCC)     BREAST CA    Fibromyalgia     Gastro-oesophageal reflux disease with hiatal hernia     GERD (gastroesophageal reflux disease)     Glaucoma     High cholesterol     History of diagnostic mammography 3/21/16    Right breast, no evidence of malignancy    Hypertension     MGUS (monoclonal gammopathy of unknown significance)     Nausea & vomiting     Other ill-defined conditions(229.57)     FIBROMYALGIA       Past Surgical History:   Procedure Laterality Date    HX CATARACT REMOVAL LEFT EYE    HX CHOLECYSTECTOMY  1990    hernia repair  8/2013    HX GI  2011    COLONSCOPY/EGD    HX GI      hemrriodectomy 2014    HX HEENT      LASER LEFT EYE    HX HERNIA REPAIR      HX HYSTERECTOMY  1984    HX MASTECTOMY  1982    RIGHT MASTECTOMY/LYMPH NODES    [facility] CARDIAC SURG PROCEDURE UNLIST      aortic insuffientcy       Social History     Socioeconomic History    Marital status:      Spouse name: Not on file    Number of children: Not on file    Years of education: Not on file    Highest education level: Not on file   Occupational History    Not on file   Tobacco Use    Smoking status: Never Smoker    Smokeless tobacco: Never Used   Substance and Sexual Activity    Alcohol use: No    Drug use: Yes     Types: Prescription    Sexual activity: Not on file   Other Topics Concern    Not on file   Social History Narrative    Not on file     Social Determinants of Health     Financial Resource Strain:     Difficulty of Paying Living Expenses: Not on file   Food Insecurity:     Worried About Running Out of Food in the Last Year: Not on file    Norbert of Food in the Last Year: Not on file   Transportation Needs:     Lack of Transportation (Medical): Not on file    Lack of Transportation (Non-Medical):  Not on file   Physical Activity:     Days of Exercise per Week: Not on file    Minutes of Exercise per Session: Not on file   Stress:     Feeling of Stress : Not on file   Social Connections:     Frequency of Communication with Friends and Family: Not on file    Frequency of Social Gatherings with Friends and Family: Not on file    Attends Scientology Services: Not on file    Active Member of Clubs or Organizations: Not on file    Attends Club or Organization Meetings: Not on file    Marital Status: Not on file   Intimate Partner Violence:     Fear of Current or Ex-Partner: Not on file    Emotionally Abused: Not on file    Physically Abused: Not on file    Sexually Abused: Not on file   Housing Stability:     Unable to Pay for Housing in the Last Year: Not on file    Number of Places Lived in the Last Year: Not on file    Unstable Housing in the Last Year: Not on file       Family History   Problem Relation Age of Onset    Other Mother         CEREBRAL HEMORRHAGE    Other Father         AAA       Current Outpatient Medications on File Prior to Visit   Medication Sig Dispense Refill    magnesium hydroxide (GALAN MILK OF MAGNESIA) 400 mg/5 mL suspension Take 30 mL by mouth daily as needed for Constipation.  clobetasol (TEMOVATE) 0.05 % ointment APPLY TO AFFECTED AREA DAILY 30 g 1    irbesartan (AVAPRO) 150 mg tablet       docosahexanoic acid/epa (FISH OIL PO) Take  by mouth.  COQ10, LIPOSOMAL UBIQUINOL, PO Take  by mouth.  omeprazole (PRILOSEC) 40 mg capsule TAKE 1 CAPSULE BY MOUTH 30-60 MINUTES BEFORE BREAKFAST  3    brimonidine-timolol (COMBIGAN) 0.2-0.5 % drop ophthalmic solution       VIT A/VIT C/VIT E/ZINC/COPPER (PRESERVISION AREDS PO) Take  by mouth.  valsartan (DIOVAN) 80 mg tablet Take  by mouth daily.  Hydrochlorothiazide 12.5 mg tablet Take 12.5 mg by mouth daily.  metoprolol-XL (TOPROL XL) 25 mg XL tablet Take  by mouth nightly.  RABEprazole (ACIPHEX) 20 mg tablet Take 20 mg by mouth every morning.  atorvastatin (LIPITOR) 10 mg tablet Take  by mouth nightly.  timolol (TIMOPTIC-XR) 0.5 % ophthalmic gel-forming Administer 1 Drop to right eye two (2) times a day.  latanoprost (XALATAN) 0.005 % ophthalmic solution Administer 1 Drop to right eye nightly.  aspirin 81 mg tablet Take 162 mg by mouth nightly.  CALCIUM CITRATE (CITRACAL PO) Take 1 Tab by mouth daily.  LACTOBAC CMB #3/FOS/PANTETHINE (PROBIOTIC & ACIDOPHILUS PO) Take 1 Tab by mouth every morning.  cholecalciferol, vitamin d3, (VITAMIN D) 1,000 unit tablet Take 1,000 Units by mouth daily.        No current facility-administered medications on file prior to visit. Allergies   Allergen Reactions    Clarithromycin Rash    Contrast Agent [Iodine] Rash    Famotidine Other (comments)     Throat burning, GI upset    Lipitor [Atorvastatin] Other (comments)     Per patient, bone pain    Penicillin G Rash    Sulfamethoxazole-Trimethoprim Rash       Review of Systems - History obtained from the patient  Hematological and Lymphatic ROS: negative for - bleeding problems, blood clots, swollen lymph nodes or weight loss  Respiratory ROS: no cough, shortness of breath, or wheezing  Cardiovascular ROS: no chest pain or dyspnea on exertion  Gastrointestinal ROS: no abdominal pain, change in bowel habits, or black or bloody stools  Genito-Urinary ROS: no dysuria, trouble voiding, or hematuria  Musculoskeletal ROS: negative for - joint pain or joint swelling  Dermatological ROS: negative for - rash or skin lesion changes    Negative     OBJECTIVE:    Visit Vitals  BP (!) 101/51 (BP 1 Location: Left arm, BP Patient Position: Sitting)   Pulse 61   Ht 5' 3\" (1.6 m)   Wt 136 lb 6.4 oz (61.9 kg)   BMI 24.16 kg/m²      Physical Exam:  General: Alert and oriented. No acute distress. Well-nourished  HEENT: No thyroid enlargment. Neck supple without restrictions. Sclera normal. Normal occular motion. Moist mucous membranes. Lymphatics: No evidence of cervical, or subclavicular adenopathy. Respiratory: clear to auscultation and percussion to the bases. No CVAT. Cardiovascular: regular rate and rhythm. No murmurs, rubs, or gallops. Gastrointestinal: soft, non-tender, non-distended, no masses or organomegaly. Well-healed incision. Musculoskeletal: normal gait. No joint tenderness, deformity or swelling. No muscular tenderness. Extremities: extremities normal, atraumatic, no cyanosis or edema. Pelvic: exam chaperoned by nurse. External genitalia appear normal with some atrophy. No lesions noted. On speculum exam, the vagina is atrophic.  The uterus and cervix are surgically absent. On bimanual exam, there is no masses or nodularity. Rectal exam deferred. Neuro: Grossly intact. Normal gait and movement. No acute deficit  Skin: No evidence of rashes or skin changes. IMPRESSION AND PLAN:    Marylou Monzon is a 80 y.o. female with Lichen Sclerosis     Problem List Items Addressed This Visit        Reproductive    Lichen sclerosus of female genitalia - Primary          Reviewed patient's course to date. She uses clobetasol sparingly, approximately 2-3 times every 2-3 month. Reassured patient that today's exam is normal without evidence of vulvar lesions. Plan to continue clobetasol as needed. In regard to her vaginal cytology, she has had a hysterectomy and has never had an abnormal pap smear. Based on ASCCP guidelines, no further pap smears are indicated. Patient also has a history of breast cancer, ER negative. She is followed locally for this and receives regular imaging. RTC as needed. Reviewed precautionary symptoms to return to clinic sooner. All questions and concerns were addressed with the patient and she is comfortable with the plan.     Rosemary Hernandez MD

## 2022-07-19 ENCOUNTER — OFFICE VISIT (OUTPATIENT)
Dept: GYNECOLOGY | Age: 87
End: 2022-07-19
Payer: MEDICARE

## 2022-07-19 VITALS
SYSTOLIC BLOOD PRESSURE: 101 MMHG | HEART RATE: 61 BPM | HEIGHT: 63 IN | BODY MASS INDEX: 24.17 KG/M2 | DIASTOLIC BLOOD PRESSURE: 51 MMHG | WEIGHT: 136.4 LBS

## 2022-07-19 DIAGNOSIS — N90.4 LICHEN SCLEROSUS OF FEMALE GENITALIA: Primary | ICD-10-CM

## 2022-07-19 PROCEDURE — G8432 DEP SCR NOT DOC, RNG: HCPCS | Performed by: OBSTETRICS & GYNECOLOGY

## 2022-07-19 PROCEDURE — 1090F PRES/ABSN URINE INCON ASSESS: CPT | Performed by: OBSTETRICS & GYNECOLOGY

## 2022-07-19 PROCEDURE — G8427 DOCREV CUR MEDS BY ELIG CLIN: HCPCS | Performed by: OBSTETRICS & GYNECOLOGY

## 2022-07-19 PROCEDURE — 1101F PT FALLS ASSESS-DOCD LE1/YR: CPT | Performed by: OBSTETRICS & GYNECOLOGY

## 2022-07-19 PROCEDURE — G8536 NO DOC ELDER MAL SCRN: HCPCS | Performed by: OBSTETRICS & GYNECOLOGY

## 2022-07-19 PROCEDURE — 99213 OFFICE O/P EST LOW 20 MIN: CPT | Performed by: OBSTETRICS & GYNECOLOGY

## 2022-07-19 PROCEDURE — 1123F ACP DISCUSS/DSCN MKR DOCD: CPT | Performed by: OBSTETRICS & GYNECOLOGY

## 2022-07-19 PROCEDURE — G8420 CALC BMI NORM PARAMETERS: HCPCS | Performed by: OBSTETRICS & GYNECOLOGY

## 2022-07-19 PROCEDURE — G0463 HOSPITAL OUTPT CLINIC VISIT: HCPCS | Performed by: OBSTETRICS & GYNECOLOGY

## 2022-07-19 RX ORDER — SERTRALINE HYDROCHLORIDE 25 MG/1
TABLET, FILM COATED ORAL
COMMUNITY
Start: 2022-06-23

## 2022-07-19 RX ORDER — AMLODIPINE BESYLATE 5 MG/1
TABLET ORAL
COMMUNITY
Start: 2022-06-21

## 2022-07-19 RX ORDER — PREDNISOLONE ACETATE 10 MG/ML
SUSPENSION/ DROPS OPHTHALMIC
COMMUNITY
Start: 2022-06-04

## 2022-07-19 RX ORDER — DENOSUMAB 60 MG/ML
60 INJECTION SUBCUTANEOUS
COMMUNITY

## 2022-09-13 ENCOUNTER — FOLLOW UP (OUTPATIENT)
Dept: URBAN - METROPOLITAN AREA CLINIC 98 | Facility: CLINIC | Age: 87
End: 2022-09-13

## 2022-09-13 DIAGNOSIS — H35.372: ICD-10-CM

## 2022-09-13 DIAGNOSIS — H34.8310: ICD-10-CM

## 2022-09-13 DIAGNOSIS — H40.1134: ICD-10-CM

## 2022-09-13 PROCEDURE — 67028 INJECTION EYE DRUG: CPT

## 2022-09-13 PROCEDURE — PFS EYLEA PFS

## 2022-09-13 PROCEDURE — 92202 OPSCPY EXTND ON/MAC DRAW: CPT | Mod: 59

## 2022-09-13 PROCEDURE — 92134 CPTRZ OPH DX IMG PST SGM RTA: CPT

## 2022-09-13 PROCEDURE — 92014 COMPRE OPH EXAM EST PT 1/>: CPT | Mod: 25

## 2022-09-13 ASSESSMENT — VISUAL ACUITY
OD_SC: 20/25-2
OS_SC: 20/150+1
OS_PH: 20/100+2

## 2022-09-13 ASSESSMENT — TONOMETRY: OD_IOP_MMHG: 9

## 2023-02-08 ENCOUNTER — FOLLOW UP (OUTPATIENT)
Dept: URBAN - METROPOLITAN AREA CLINIC 98 | Facility: CLINIC | Age: 88
End: 2023-02-08

## 2023-02-08 DIAGNOSIS — H35.372: ICD-10-CM

## 2023-02-08 DIAGNOSIS — H40.1134: ICD-10-CM

## 2023-02-08 DIAGNOSIS — H34.8310: ICD-10-CM

## 2023-02-08 PROCEDURE — 92014 COMPRE OPH EXAM EST PT 1/>: CPT | Mod: 25

## 2023-02-08 PROCEDURE — 92134 CPTRZ OPH DX IMG PST SGM RTA: CPT

## 2023-02-08 PROCEDURE — 92202 OPSCPY EXTND ON/MAC DRAW: CPT | Mod: 59

## 2023-02-08 PROCEDURE — PFS EYLEA PFS

## 2023-02-08 PROCEDURE — 67028 INJECTION EYE DRUG: CPT

## 2023-02-08 ASSESSMENT — TONOMETRY
OD_IOP_MMHG: 12
OS_IOP_MMHG: 1
OD_IOP_MMHG: 12

## 2023-02-08 ASSESSMENT — VISUAL ACUITY
OD_PH: 20/40+2
OD_SC: 20/40
OS_SC: 20/100-1

## 2023-05-17 ENCOUNTER — FOLLOW UP (OUTPATIENT)
Dept: URBAN - METROPOLITAN AREA CLINIC 98 | Facility: CLINIC | Age: 88
End: 2023-05-17

## 2023-05-17 DIAGNOSIS — H34.8310: ICD-10-CM

## 2023-05-17 DIAGNOSIS — H35.372: ICD-10-CM

## 2023-05-17 DIAGNOSIS — H40.1134: ICD-10-CM

## 2023-05-17 PROCEDURE — PFS EYLEA PFS

## 2023-05-17 PROCEDURE — 92014 COMPRE OPH EXAM EST PT 1/>: CPT | Mod: 25

## 2023-05-17 PROCEDURE — 92134 CPTRZ OPH DX IMG PST SGM RTA: CPT

## 2023-05-17 PROCEDURE — 92202 OPSCPY EXTND ON/MAC DRAW: CPT | Mod: 59

## 2023-05-17 PROCEDURE — 67028 INJECTION EYE DRUG: CPT

## 2023-05-17 ASSESSMENT — VISUAL ACUITY
OS_SC: 20/150-1
OD_SC: 20/30-1

## 2023-05-17 ASSESSMENT — TONOMETRY: OD_IOP_MMHG: 7

## 2023-08-09 ENCOUNTER — FOLLOW UP (OUTPATIENT)
Dept: URBAN - METROPOLITAN AREA CLINIC 98 | Facility: CLINIC | Age: 88
End: 2023-08-09

## 2023-08-09 DIAGNOSIS — H34.8310: ICD-10-CM

## 2023-08-09 DIAGNOSIS — H35.372: ICD-10-CM

## 2023-08-09 PROCEDURE — PFS EYLEA PFS: Mod: JZ

## 2023-08-09 PROCEDURE — 92014 COMPRE OPH EXAM EST PT 1/>: CPT | Mod: 25

## 2023-08-09 PROCEDURE — 92134 CPTRZ OPH DX IMG PST SGM RTA: CPT

## 2023-08-09 PROCEDURE — 92202 OPSCPY EXTND ON/MAC DRAW: CPT | Mod: 59

## 2023-08-09 PROCEDURE — 67028 INJECTION EYE DRUG: CPT

## 2023-08-09 ASSESSMENT — TONOMETRY: OD_IOP_MMHG: 11

## 2023-08-09 ASSESSMENT — VISUAL ACUITY
OD_SC: 20/25
OS_SC: 20/150

## 2023-10-31 ENCOUNTER — FOLLOW UP (OUTPATIENT)
Dept: URBAN - METROPOLITAN AREA CLINIC 98 | Facility: CLINIC | Age: 88
End: 2023-10-31

## 2023-10-31 DIAGNOSIS — H40.1134: ICD-10-CM

## 2023-10-31 DIAGNOSIS — H35.372: ICD-10-CM

## 2023-10-31 DIAGNOSIS — H34.8310: ICD-10-CM

## 2023-10-31 PROCEDURE — 67028 INJECTION EYE DRUG: CPT

## 2023-10-31 PROCEDURE — 92202 OPSCPY EXTND ON/MAC DRAW: CPT | Mod: 59

## 2023-10-31 PROCEDURE — 92134 CPTRZ OPH DX IMG PST SGM RTA: CPT

## 2023-10-31 PROCEDURE — 92014 COMPRE OPH EXAM EST PT 1/>: CPT | Mod: 25

## 2023-10-31 PROCEDURE — PFS EYLEA PFS: Mod: JZ

## 2023-10-31 ASSESSMENT — VISUAL ACUITY
OD_SC: 20/25-2
OS_SC: 20/100+1

## 2023-10-31 ASSESSMENT — TONOMETRY: OD_IOP_MMHG: 10

## 2024-01-30 ENCOUNTER — FOLLOW UP (OUTPATIENT)
Dept: URBAN - METROPOLITAN AREA CLINIC 98 | Facility: CLINIC | Age: 89
End: 2024-01-30

## 2024-01-30 DIAGNOSIS — H40.1134: ICD-10-CM

## 2024-01-30 DIAGNOSIS — H34.8310: ICD-10-CM

## 2024-01-30 DIAGNOSIS — H35.372: ICD-10-CM

## 2024-01-30 PROCEDURE — 92134 CPTRZ OPH DX IMG PST SGM RTA: CPT

## 2024-01-30 PROCEDURE — 92014 COMPRE OPH EXAM EST PT 1/>: CPT | Mod: 25

## 2024-01-30 PROCEDURE — 92202 OPSCPY EXTND ON/MAC DRAW: CPT | Mod: 59

## 2024-01-30 PROCEDURE — 67028 INJECTION EYE DRUG: CPT

## 2024-01-30 PROCEDURE — PFS EYLEA PFS: Mod: JZ

## 2024-01-30 ASSESSMENT — TONOMETRY: OD_IOP_MMHG: 12

## 2024-01-30 ASSESSMENT — VISUAL ACUITY
OD_SC: 20/25-3
OS_SC: CF 5FT

## 2024-04-23 ENCOUNTER — FOLLOW UP (OUTPATIENT)
Dept: URBAN - METROPOLITAN AREA CLINIC 98 | Facility: CLINIC | Age: 89
End: 2024-04-23

## 2024-04-23 DIAGNOSIS — H40.1134: ICD-10-CM

## 2024-04-23 DIAGNOSIS — H35.3133: ICD-10-CM

## 2024-04-23 DIAGNOSIS — H34.8310: ICD-10-CM

## 2024-04-23 DIAGNOSIS — H35.372: ICD-10-CM

## 2024-04-23 PROCEDURE — 92134 CPTRZ OPH DX IMG PST SGM RTA: CPT

## 2024-04-23 PROCEDURE — PFS EYLEA PFS: Mod: JZ

## 2024-04-23 PROCEDURE — 92202 OPSCPY EXTND ON/MAC DRAW: CPT | Mod: 59

## 2024-04-23 PROCEDURE — 67028 INJECTION EYE DRUG: CPT

## 2024-04-23 PROCEDURE — 92014 COMPRE OPH EXAM EST PT 1/>: CPT | Mod: 25

## 2024-04-23 ASSESSMENT — VISUAL ACUITY
OD_SC: 20/30-1
OS_SC: CF 5FT

## 2024-04-23 ASSESSMENT — TONOMETRY: OD_IOP_MMHG: 14

## 2025-08-20 ENCOUNTER — FOLLOW UP (OUTPATIENT)
Dept: URBAN - METROPOLITAN AREA CLINIC 98 | Facility: CLINIC | Age: OVER 89
End: 2025-08-20

## 2025-08-20 DIAGNOSIS — H35.3133: ICD-10-CM

## 2025-08-20 DIAGNOSIS — H35.372: ICD-10-CM

## 2025-08-20 DIAGNOSIS — H34.8310: ICD-10-CM

## 2025-08-20 DIAGNOSIS — H04.123: ICD-10-CM

## 2025-08-20 DIAGNOSIS — H40.1134: ICD-10-CM

## 2025-08-20 PROCEDURE — 92202 OPSCPY EXTND ON/MAC DRAW: CPT | Mod: 59

## 2025-08-20 PROCEDURE — PFS EYLEA PFS: Mod: JZ

## 2025-08-20 PROCEDURE — 67028 INJECTION EYE DRUG: CPT

## 2025-08-20 PROCEDURE — 92134 CPTRZ OPH DX IMG PST SGM RTA: CPT

## 2025-08-20 PROCEDURE — 92014 COMPRE OPH EXAM EST PT 1/>: CPT | Mod: 25

## 2025-08-20 ASSESSMENT — VISUAL ACUITY
OD_SC: 20/25+1
OS_SC: CF 3FT

## 2025-08-20 ASSESSMENT — TONOMETRY: OD_IOP_MMHG: 10
